# Patient Record
Sex: MALE | Race: WHITE | NOT HISPANIC OR LATINO | URBAN - METROPOLITAN AREA
[De-identification: names, ages, dates, MRNs, and addresses within clinical notes are randomized per-mention and may not be internally consistent; named-entity substitution may affect disease eponyms.]

---
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
## 2021-03-11 NOTE — ED PROVIDER NOTE - ATTENDING CONTRIBUTION TO CARE
Goal Outcome Evaluation:  Plan of Care Reviewed With: patient     Outcome Summary: Initial PT evaluation complete.  Patient is alert and cooperative. She requires CGA with bed mobility, min Ax1 with transfers and gait.  Patient ambulates 5'x1, sidestepping R to HOB, maintains NWB on LLE.  Patient has spouse at home, ramp to enter home and a w/c; she may be able to discharge with just HHPT, pending progress with I/P PT. Goals established, continue skilled PT.   MD Alas:  patient seen and evaluated with the resident.  I was present for key portions of the History & Physical, and I agree with the Impression & Plan.  MD Alas:  18 yo M, autistic, c/o L arm pain s/p mech fall down 4 stairs.  Onset: 8pm.  Context:  parents noted him cradling his L arm after the injury, but wanted to give him some time before coming to ED. Fall was witnessed and parents deny head injury. Assocated Sx:  n/a (nonverbal at baseline).  Intensity: more irritable than usual.  VS: tachy.  Physical Exam: male child, morbidly obese, NCAT, PERRL, extremity: L UE held in extension and internal rotation, +wiggles fingers, unable to abduct the L shoulder or oppose L shoulder.  Impression:  body habitus is very large and combined with baseline mental status (uncooperative autistic male) make physical exam unreliable.  DDx = muscle injury/contusion, clavicle Fx, elbow fx, dislocation.  Plan:  xrays, reassess.

## 2024-10-15 NOTE — ED PROVIDER NOTE - CARDIAC
General Sunscreen Counseling: I recommended a broad spectrum sunscreen with a SPF of 30 or higher.  I explained that SPF 30 sunscreens block approximately 97 percent of the sun's harmful rays.  Sunscreens should be applied at least 15 minutes prior to expected sun exposure and then every 2 hours after that as long as sun exposure continues. If swimming or exercising sunscreen should be reapplied every 45 minutes to an hour after getting wet or sweating.  One ounce, or the equivalent of a shot glass full of sunscreen, is adequate to protect the skin not covered by a bathing suit. I also recommended a lip balm with a sunscreen as well. Sun protective clothing can be used in lieu of sunscreen but must be worn the entire time you are exposed to the sun's rays.
Products Recommended: .\\n\\n- Apply Mineral SPF 30-50 the to face, neck and decotellage daily ( ingredients: Zinc Oxide, Titanium Dioxide).\\n\\n.
Detail Level: Zone
Regular rate and rhythm, Heart sounds S1 S2 present, no murmurs, rubs or gallops